# Patient Record
Sex: FEMALE | Race: WHITE | NOT HISPANIC OR LATINO | Employment: UNEMPLOYED | URBAN - METROPOLITAN AREA
[De-identification: names, ages, dates, MRNs, and addresses within clinical notes are randomized per-mention and may not be internally consistent; named-entity substitution may affect disease eponyms.]

---

## 2017-03-31 ENCOUNTER — CONVERSION ENCOUNTER (OUTPATIENT)
Dept: MAMMOGRAPHY | Facility: HOSPITAL | Age: 47
End: 2017-03-31

## 2019-02-13 ENCOUNTER — HOSPITAL ENCOUNTER (OUTPATIENT)
Dept: MAMMOGRAPHY | Facility: HOSPITAL | Age: 49
Discharge: HOME | End: 2019-02-13
Attending: OBSTETRICS & GYNECOLOGY

## 2020-05-14 ENCOUNTER — OFFICE VISIT CONVERTED (OUTPATIENT)
Dept: OTHER | Facility: HOSPITAL | Age: 50
End: 2020-05-14
Attending: NURSE PRACTITIONER

## 2020-05-14 ENCOUNTER — HOSPITAL ENCOUNTER (OUTPATIENT)
Dept: OTHER | Facility: HOSPITAL | Age: 50
Discharge: HOME | End: 2020-05-14

## 2020-05-17 LAB — SARS-COV-2 RNA SPEC QL NAA+PROBE: NOT DETECTED

## 2020-11-24 ENCOUNTER — HOSPITAL ENCOUNTER (OUTPATIENT)
Dept: URGENT CARE | Facility: CLINIC | Age: 50
Discharge: HOME | End: 2020-11-24
Attending: NURSE PRACTITIONER

## 2020-11-27 LAB — SARS-COV-2 RNA SPEC QL NAA+PROBE: NOT DETECTED

## 2021-05-03 ENCOUNTER — HOSPITAL ENCOUNTER (OUTPATIENT)
Dept: PHYSICAL THERAPY | Facility: CLINIC | Age: 51
Setting detail: RECURRING SERIES
Discharge: HOME | End: 2021-05-05
Attending: INTERNAL MEDICINE

## 2021-05-10 NOTE — H&P
History and Physical      Patient Name: Tarah Renee   Patient ID: [id number]   Sex: Female   YOB: 1970    Primary Care Provider: Joel Hernandez MD    Visit Date: May 14, 2020    Provider: DENIA Carmona   Location[facility]   Location Address: [address] [place] [address]  [id number]   Location Phone: [phone]          Chief Complaint  · Evaluation for Respiratory Virus      History Of Present Illness  Tarah Renee is a 49 year old female who presents today to the clinic for evaluation of a respiratory virus.   Date of Onset: 2020   What Symptoms: fatigue and fever   Quality of Symptoms: low grade fever   Anything make it worse or better: nothing   Severity of Symptoms: mild   Any known Exposure to COVID-19: no known exposure.      Patient presents the respiratory virus evaluation center with fatigue and fever for 2 days.  T-max 100.  No over-the-counter medicines.  No known exposure.  Patient has a history of asthma and diabetes.       Past Medical History  Diabetes         Past Surgical History           Medication List  atorvastatin 20 mg oral tablet; carvedilol 25 mg oral tablet; cetirizine 10 mg oral tablet; hydrochlorothiazide 25 mg oral tablet; Januvia 100 mg oral tablet; Jardiance 10 mg oral tablet; lisinopril 40 mg oral tablet; metformin 1,000 mg oral tablet; paroxetine HCl 10 mg oral tablet         Allergy List  amoxicillin         Family Medical History  - No Family History of Colorectal Cancer         Social History  Alcohol; Nonsmoker         Review of Systems  · Constitutional  o Admits  o : fatigue, fever  o Denies  o : weight gain, weight loss  · HENT  o Denies  o : headaches, vertigo, recent head injury, sinus pain, nasal congestion, nasal discharge, dental problems, sore throat  · Respiratory  o Denies  o : cough, asthma  · Gastrointestinal  o Denies  o : nausea, vomiting, diarrhea, constipation, abdominal  pain  · Integument  o Denies  o : rash  · Neurologic  o Denies  o : headache      Vitals  Date Time BP Position Site L\R Cuff Size HR RR TEMP (F) WT  HT  BMI kg/m2 BSA m2 O2 Sat        05/14/2020 08:08 AM      96 - R  98.2     95 %          Physical Examination  · Constitutional  o Appearance  o : no acute distress, well-nourished  · Head and Face  o Head  o :   § Inspection  § : atraumatic, normocephalic  · Eyes  o Eyes  o : extraocular movements intact, no scleral icterus, no conjunctival injection  · Ears, Nose, Mouth and Throat  o Ears  o :   § External Ears  § : normal  § Otoscopic Examination  § : normal tympanic membrane exam  o Nose  o :   § Intranasal Exam  § : sinuses nontender to palpation  o Oral Cavity  o :   § Oral Mucosa  § : moist mucous membranes  o Throat  o :   § Oropharynx  § : normal tonsils, normal oropharynx  · Respiratory  o Respiratory Effort  o : breathing comfortably, symmetric chest rise  o Auscultation of Lungs  o : clear to asculatation bilaterally, no wheezes, rales, or rhonchii  · Cardiovascular  o Heart  o :   § Auscultation of Heart  § : regular rate and rhythm, no murmurs, rubs, or gallops  o Peripheral Vascular System  o :   § Extremities  § : no edema  · Lymphatic  o Neck  o : no lymphadenopathy present  o Supraclavicular Nodes  o : no supraclavicular nodes  · Skin and Subcutaneous Tissue  o General Inspection  o : normal inspection  · Neurologic  o Mental Status Examination  o :   § Orientation  § : grossly oriented to person, place and time  o Gait and Station  o :   § Gait Screening  § : normal gait  · Psychiatric  o General  o : normal mood and affect              Assessment  · Fatigue     780.79/R53.83  · Fever     780.60/R50.9  · Upper respiratory infection     465.9/J06.9  Patient presented with symptoms of viral respiratory infection. Advised to drink plenty of fluids, run a cool-mist humidifier in room at night, gargle salt water for sore throat, and get plenty of  rest. Patient should avoid over-exertion and reduce exposure to irritants such as smoke, cold, dry air, and dust.  Treatment currently involves symptomatic relief. Patient may take acetaminophen as directed to reduce fever and body aches.   Patient understood these instructions and will follow up in the office if symptoms not improving.   Educated patient that I cannot exclude Covid-19 as a diagnosis. Patient was in tier 2 for covid testing and was tested today. Patient will be notified of results in 24 hrs. Educated patient on self quarantine and self-monitoring. Patient given work note and education provided by the CDC.      Plan  · Orders  o [facility] NCOV2 (send-out) (57357) - 780.79/R53.83, 780.60/R50.9 - 05/14/2020  · Medications  o Medications have been Reconciled  · Instructions  o Chronic conditions reviewed and taken into consideration for today's treatment plan.   o Plan of Care:   o Patient instructed to seek medical attention urgently for new or worsening symptoms.  o Patient was educated on their diagnosis, treatment, and medications today.   o Recommend self monitoring. Instructions given.   o Self-monitoring for 14 days. Instructions given.  o Stay home until without fever for 72 hours without using fever-reducing medications and other symptoms are gone.  o Electronically Identified Patient Education Materials Provided Electronically  · Disposition  o Call or Return if symptoms worsen or persist.  o As Needed for Follow Up            Electronically Signed by: DENIA Carmona -Author on May 14, 2020 08:36:24 AM

## 2021-05-15 VITALS — OXYGEN SATURATION: 95 % | HEART RATE: 96 BPM | TEMPERATURE: 98.2 F

## 2022-10-26 ENCOUNTER — HOSPITAL ENCOUNTER (EMERGENCY)
Facility: HOSPITAL | Age: 52
Discharge: HOME | End: 2022-10-26
Attending: EMERGENCY MEDICINE | Admitting: EMERGENCY MEDICINE

## 2022-10-26 ENCOUNTER — APPOINTMENT (OUTPATIENT)
Dept: GENERAL RADIOLOGY | Facility: HOSPITAL | Age: 52
End: 2022-10-26

## 2022-10-26 VITALS
OXYGEN SATURATION: 94 % | SYSTOLIC BLOOD PRESSURE: 123 MMHG | TEMPERATURE: 98.6 F | BODY MASS INDEX: 39.93 KG/M2 | WEIGHT: 239.64 LBS | DIASTOLIC BLOOD PRESSURE: 70 MMHG | RESPIRATION RATE: 16 BRPM | HEART RATE: 66 BPM | HEIGHT: 65 IN

## 2022-10-26 DIAGNOSIS — M54.9 BACK PAIN, UNSPECIFIED BACK LOCATION, UNSPECIFIED BACK PAIN LATERALITY, UNSPECIFIED CHRONICITY: Primary | ICD-10-CM

## 2022-10-26 LAB
ALBUMIN SERPL-MCNC: 4.3 G/DL (ref 3.5–5.2)
ALBUMIN/GLOB SERPL: 1.7 G/DL
ALP SERPL-CCNC: 79 U/L (ref 39–117)
ALT SERPL W P-5'-P-CCNC: 32 U/L (ref 1–33)
ANION GAP SERPL CALCULATED.3IONS-SCNC: 13.1 MMOL/L (ref 5–15)
AST SERPL-CCNC: 24 U/L (ref 1–32)
BASOPHILS # BLD AUTO: 0.11 10*3/MM3 (ref 0–0.2)
BASOPHILS NFR BLD AUTO: 0.9 % (ref 0–1.5)
BILIRUB SERPL-MCNC: 0.2 MG/DL (ref 0–1.2)
BUN SERPL-MCNC: 21 MG/DL (ref 6–20)
BUN/CREAT SERPL: 22.1 (ref 7–25)
CALCIUM SPEC-SCNC: 9.4 MG/DL (ref 8.6–10.5)
CHLORIDE SERPL-SCNC: 100 MMOL/L (ref 98–107)
CO2 SERPL-SCNC: 24.9 MMOL/L (ref 22–29)
CREAT SERPL-MCNC: 0.95 MG/DL (ref 0.57–1)
DEPRECATED RDW RBC AUTO: 41.9 FL (ref 37–54)
EGFRCR SERPLBLD CKD-EPI 2021: 71.3 ML/MIN/1.73
EOSINOPHIL # BLD AUTO: 0.54 10*3/MM3 (ref 0–0.4)
EOSINOPHIL NFR BLD AUTO: 4.6 % (ref 0.3–6.2)
ERYTHROCYTE [DISTWIDTH] IN BLOOD BY AUTOMATED COUNT: 14.5 % (ref 12.3–15.4)
GLOBULIN UR ELPH-MCNC: 2.5 GM/DL
GLUCOSE SERPL-MCNC: 200 MG/DL (ref 65–99)
HCT VFR BLD AUTO: 43.6 % (ref 34–46.6)
HGB BLD-MCNC: 13.9 G/DL (ref 12–15.9)
HOLD SPECIMEN: NORMAL
IMM GRANULOCYTES # BLD AUTO: 0.07 10*3/MM3 (ref 0–0.05)
IMM GRANULOCYTES NFR BLD AUTO: 0.6 % (ref 0–0.5)
LIPASE SERPL-CCNC: 43 U/L (ref 13–60)
LYMPHOCYTES # BLD AUTO: 3.74 10*3/MM3 (ref 0.7–3.1)
LYMPHOCYTES NFR BLD AUTO: 32 % (ref 19.6–45.3)
MAGNESIUM SERPL-MCNC: 1.9 MG/DL (ref 1.6–2.6)
MCH RBC QN AUTO: 25.6 PG (ref 26.6–33)
MCHC RBC AUTO-ENTMCNC: 31.9 G/DL (ref 31.5–35.7)
MCV RBC AUTO: 80.1 FL (ref 79–97)
MONOCYTES # BLD AUTO: 0.97 10*3/MM3 (ref 0.1–0.9)
MONOCYTES NFR BLD AUTO: 8.3 % (ref 5–12)
NEUTROPHILS NFR BLD AUTO: 53.6 % (ref 42.7–76)
NEUTROPHILS NFR BLD AUTO: 6.27 10*3/MM3 (ref 1.7–7)
NRBC BLD AUTO-RTO: 0 /100 WBC (ref 0–0.2)
NT-PROBNP SERPL-MCNC: 92.5 PG/ML (ref 0–900)
PLATELET # BLD AUTO: 255 10*3/MM3 (ref 140–450)
PMV BLD AUTO: 9.7 FL (ref 6–12)
POTASSIUM SERPL-SCNC: 3.8 MMOL/L (ref 3.5–5.2)
PROT SERPL-MCNC: 6.8 G/DL (ref 6–8.5)
RBC # BLD AUTO: 5.44 10*6/MM3 (ref 3.77–5.28)
SODIUM SERPL-SCNC: 138 MMOL/L (ref 136–145)
TROPONIN I SERPL-MCNC: 0 NG/ML (ref 0–0.08)
WBC NRBC COR # BLD: 11.7 10*3/MM3 (ref 3.4–10.8)
WHOLE BLOOD HOLD COAG: NORMAL
WHOLE BLOOD HOLD SPECIMEN: NORMAL

## 2022-10-26 PROCEDURE — 71045 X-RAY EXAM CHEST 1 VIEW: CPT

## 2022-10-26 PROCEDURE — 83880 ASSAY OF NATRIURETIC PEPTIDE: CPT | Performed by: EMERGENCY MEDICINE

## 2022-10-26 PROCEDURE — 96375 TX/PRO/DX INJ NEW DRUG ADDON: CPT

## 2022-10-26 PROCEDURE — 80053 COMPREHEN METABOLIC PANEL: CPT | Performed by: EMERGENCY MEDICINE

## 2022-10-26 PROCEDURE — 85025 COMPLETE CBC W/AUTO DIFF WBC: CPT | Performed by: EMERGENCY MEDICINE

## 2022-10-26 PROCEDURE — 93005 ELECTROCARDIOGRAM TRACING: CPT | Performed by: EMERGENCY MEDICINE

## 2022-10-26 PROCEDURE — 84484 ASSAY OF TROPONIN QUANT: CPT

## 2022-10-26 PROCEDURE — 96374 THER/PROPH/DIAG INJ IV PUSH: CPT

## 2022-10-26 PROCEDURE — 99284 EMERGENCY DEPT VISIT MOD MDM: CPT

## 2022-10-26 PROCEDURE — 25010000002 ORPHENADRINE CITRATE PER 60 MG: Performed by: EMERGENCY MEDICINE

## 2022-10-26 PROCEDURE — 36415 COLL VENOUS BLD VENIPUNCTURE: CPT

## 2022-10-26 PROCEDURE — 83735 ASSAY OF MAGNESIUM: CPT | Performed by: EMERGENCY MEDICINE

## 2022-10-26 PROCEDURE — 83690 ASSAY OF LIPASE: CPT | Performed by: EMERGENCY MEDICINE

## 2022-10-26 PROCEDURE — 25010000002 KETOROLAC TROMETHAMINE PER 15 MG: Performed by: EMERGENCY MEDICINE

## 2022-10-26 RX ORDER — CYCLOBENZAPRINE HCL 10 MG
10 TABLET ORAL 3 TIMES DAILY
Qty: 20 TABLET | Refills: 0 | Status: SHIPPED | OUTPATIENT
Start: 2022-10-26

## 2022-10-26 RX ORDER — SODIUM CHLORIDE 0.9 % (FLUSH) 0.9 %
10 SYRINGE (ML) INJECTION AS NEEDED
Status: DISCONTINUED | OUTPATIENT
Start: 2022-10-26 | End: 2022-10-26 | Stop reason: HOSPADM

## 2022-10-26 RX ORDER — KETOROLAC TROMETHAMINE 10 MG/1
10 TABLET, FILM COATED ORAL EVERY 6 HOURS PRN
Qty: 15 TABLET | Refills: 0 | Status: SHIPPED | OUTPATIENT
Start: 2022-10-26

## 2022-10-26 RX ORDER — ORPHENADRINE CITRATE 30 MG/ML
60 INJECTION INTRAMUSCULAR; INTRAVENOUS ONCE
Status: COMPLETED | OUTPATIENT
Start: 2022-10-26 | End: 2022-10-26

## 2022-10-26 RX ORDER — KETOROLAC TROMETHAMINE 30 MG/ML
30 INJECTION, SOLUTION INTRAMUSCULAR; INTRAVENOUS ONCE
Status: COMPLETED | OUTPATIENT
Start: 2022-10-26 | End: 2022-10-26

## 2022-10-26 RX ORDER — ASPIRIN 81 MG/1
324 TABLET, CHEWABLE ORAL ONCE
Status: DISCONTINUED | OUTPATIENT
Start: 2022-10-26 | End: 2022-10-26 | Stop reason: HOSPADM

## 2022-10-26 RX ORDER — AZITHROMYCIN 250 MG/1
TABLET, FILM COATED ORAL
Qty: 6 TABLET | Refills: 0 | Status: SHIPPED | OUTPATIENT
Start: 2022-10-26 | End: 2022-10-31

## 2022-10-26 RX ADMIN — KETOROLAC TROMETHAMINE 30 MG: 30 INJECTION, SOLUTION INTRAMUSCULAR; INTRAVENOUS at 06:43

## 2022-10-26 RX ADMIN — ORPHENADRINE CITRATE 60 MG: 60 INJECTION INTRAMUSCULAR; INTRAVENOUS at 06:44

## 2022-11-01 LAB
QT INTERVAL: 357 MS
QT INTERVAL: 374 MS

## 2024-02-24 ENCOUNTER — HOSPITAL ENCOUNTER (EMERGENCY)
Facility: HOSPITAL | Age: 54
Discharge: HOME | End: 2024-02-24
Attending: EMERGENCY MEDICINE
Payer: COMMERCIAL

## 2024-02-24 ENCOUNTER — APPOINTMENT (OUTPATIENT)
Dept: GENERAL RADIOLOGY | Facility: HOSPITAL | Age: 54
End: 2024-02-24
Payer: COMMERCIAL

## 2024-02-24 VITALS
HEIGHT: 65 IN | DIASTOLIC BLOOD PRESSURE: 97 MMHG | OXYGEN SATURATION: 97 % | WEIGHT: 247.8 LBS | TEMPERATURE: 97.6 F | BODY MASS INDEX: 41.29 KG/M2 | HEART RATE: 79 BPM | SYSTOLIC BLOOD PRESSURE: 145 MMHG | RESPIRATION RATE: 24 BRPM
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
## 2024-02-24 DIAGNOSIS — S20.212A CONTUSION OF LEFT CHEST WALL, INITIAL ENCOUNTER: Primary | ICD-10-CM

## 2024-02-24 PROCEDURE — 71045 X-RAY EXAM CHEST 1 VIEW: CPT

## 2024-02-24 PROCEDURE — 99282 EMERGENCY DEPT VISIT SF MDM: CPT

## 2024-02-24 RX ORDER — IBUPROFEN 800 MG/1
800 TABLET ORAL EVERY 6 HOURS PRN
Qty: 60 TABLET | Refills: 0 | Status: SHIPPED | OUTPATIENT
Start: 2024-02-24

## 2024-02-24 NOTE — DISCHARGE INSTRUCTIONS
Apply ice for 20mins on and 20mins off for the next few days, then you can transition to alternating moist heat with ice if you are still having pain  Take the prescribed ibuprofen for pain  Be sure to take good deep breaths frequently to avoid pneumonia  Follow up with your PCP in 3 days if no better  Return to the ER for any worsening or new symptoms of concern

## 2024-02-24 NOTE — ED PROVIDER NOTES
Time: 12:13 PM EST  Date of encounter:  2/24/2024  Independent Historian/Clinical History and Information was obtained by:   Patient    History is limited by: N/A    Chief Complaint: Fall      History of Present Illness:  Patient is a 53 y.o. year old female who presents to the emergency department for evaluation of upper abdominal pain after stumbling and falling on cement sidewalk.     HPI    Patient Care Team  Primary Care Provider: Joel Hernandez MD    Past Medical History:     Allergies   Allergen Reactions    Amoxicillin Hives    Penicillin G Hives     Past Medical History:   Diagnosis Date    Diabetes mellitus     Hypertension      History reviewed. No pertinent surgical history.  Family History   Problem Relation Age of Onset    Hypertension Mother     Hypertension Father        Home Medications:  Prior to Admission medications    Medication Sig Start Date End Date Taking? Authorizing Provider   atorvastatin (LIPITOR) 20 MG tablet atorvastatin 20 mg oral tablet take 1 tablet (20 mg) by oral route once daily   Active    Emergency, Nurse RUMA Gomez   carvedilol (COREG) 25 MG tablet carvedilol 25 mg oral tablet take 1 tablet (25 mg) by oral route 2 times per day with food   Active    Emergency, Nurse Epic, RN   cyclobenzaprine (FLEXERIL) 10 MG tablet Take 1 tablet by mouth 3 (Three) Times a Day. 10/26/22   Marcelo Mathis MD   empagliflozin (Jardiance) 10 MG tablet tablet Jardiance 10 mg oral tablet take 1 tablet (10 mg) by oral route once daily in the morning   Active    Emergency, Nurse Jason RN   glipizide (GLUCOTROL) 10 MG tablet Take 10 mg by mouth 2 (Two) Times a Day Before Meals.    Emergency, Nurse RUMA Gomez   hydroCHLOROthiazide (HYDRODIURIL) 25 MG tablet hydrochlorothiazide 25 mg oral tablet take one-half tablet (12.5 mg) by oral route 2 times per day   Active    Emergency, Nurse RUMA Gomez   ketorolac (TORADOL) 10 MG tablet Take 1 tablet by mouth Every 6 (Six) Hours As Needed for Moderate Pain. 10/26/22  "  Marcelo Mathis MD   lisinopril (PRINIVIL,ZESTRIL) 40 MG tablet Daily.    Emergency, Nurse Jason, RN   metFORMIN (GLUCOPHAGE) 1000 MG tablet metformin 1,000 mg oral tablet take 1 tablet (1,000 mg) by oral route 2 times per day with morning and evening meals   Active    Emergency, Nurse Jason, RN   PARoxetine (PAXIL) 10 MG tablet paroxetine HCl 10 mg oral tablet take 1 tablet (10 mg) by oral route once daily   Active    Emergency, Nurse Jason, RN   SITagliptin (Januvia) 100 MG tablet Januvia 100 mg oral tablet take 1 tablet (100 mg) by oral route once daily   Active    Emergency, Nurse Epic, RN   triamcinolone (KENALOG) 0.1 % ointment Apply 1 application topically to the appropriate area as directed 2 (Two) Times a Day. 6/10/22   Meka King MD        Social History:   Social History     Tobacco Use    Smoking status: Never    Smokeless tobacco: Never   Vaping Use    Vaping Use: Never used   Substance Use Topics    Alcohol use: Not Currently    Drug use: Never         Review of Systems:  Review of Systems   Constitutional: Negative.    HENT: Negative.     Eyes: Negative.    Respiratory: Negative.     Cardiovascular: Negative.    Gastrointestinal: Negative.    Endocrine: Negative.    Genitourinary: Negative.    Musculoskeletal:         Fall, anterior chest injury   Skin: Negative.    Allergic/Immunologic: Negative.    Neurological: Negative.    Hematological: Negative.    Psychiatric/Behavioral: Negative.          Physical Exam:  /97 (BP Location: Left arm, Patient Position: Sitting)   Pulse 79   Temp 97.6 °F (36.4 °C) (Oral)   Resp 24   Ht 165.1 cm (65\")   Wt 112 kg (247 lb 12.8 oz)   SpO2 97%   BMI 41.24 kg/m²     Physical Exam  Constitutional:       Appearance: Normal appearance.   HENT:      Head: Normocephalic and atraumatic.      Nose: Nose normal.      Mouth/Throat:      Mouth: Mucous membranes are moist.   Eyes:      Pupils: Pupils are equal, round, and reactive to light. "   Cardiovascular:      Rate and Rhythm: Normal rate and regular rhythm.      Pulses: Normal pulses.   Pulmonary:      Effort: Pulmonary effort is normal.      Breath sounds: Normal breath sounds.   Chest:       Abdominal:      General: Abdomen is flat. Bowel sounds are normal.      Palpations: Abdomen is soft.   Musculoskeletal:         General: Normal range of motion.      Cervical back: Normal range of motion.   Skin:     General: Skin is warm and dry.      Capillary Refill: Capillary refill takes less than 2 seconds.   Neurological:      General: No focal deficit present.      Mental Status: She is alert and oriented to person, place, and time. Mental status is at baseline.   Psychiatric:         Mood and Affect: Mood normal.                  Procedures:  Procedures      Medical Decision Making:      Comorbidities that affect care:    Diabetes, Obesity    External Notes reviewed:    None      The following orders were placed and all results were independently analyzed by me:  Orders Placed This Encounter   Procedures    XR Chest 1 View       Medications Given in the Emergency Department:  Medications - No data to display     ED Course:         Labs:    Lab Results (last 24 hours)       ** No results found for the last 24 hours. **             Imaging:    XR Chest 1 View    Result Date: 2/24/2024  PROCEDURE: XR CHEST 1 VW  COMPARISON: [facility], , XR CHEST 1 VW, 10/26/2022, 6:08.  INDICATIONS: fall/ chest discomfort  FINDINGS:  The lungs are clear bilaterally.  The cardiac and mediastinal silhouettes appear normal.  No effusion is seen.        1. No acute cardiopulmonary disease 2. No fracture or pneumothorax       Kiet Win M.D.       Electronically Signed and Approved By: Kiet Win M.D. on 2/24/2024 at 12:32                Differential Diagnosis and Discussion:             MDM     Amount and/or Complexity of Data Reviewed  Tests in the radiology section of CPT®: reviewed                  Patient Care Considerations:           Consultants/Shared Management Plan:    None    Social Determinants of Health:    Patient is independent, reliable, and has access to care.       Disposition and Care Coordination:    Discharged: The patient is suitable and stable for discharge with no need for consideration of admission.    I have explained the patient´s condition, diagnoses and treatment plan based on the information available to me at this time. I have answered questions and addressed any concerns. The patient has a good  understanding of the patient´s diagnosis, condition, and treatment plan as can be expected at this point. The vital signs have been stable. The patient´s condition is stable and appropriate for discharge from the emergency department.      The patient will pursue further outpatient evaluation with the primary care physician or other designated or consulting physician as outlined in the discharge instructions. They are agreeable to this plan of care and follow-up instructions have been explained in detail. The patient has received these instructions in written format and has expressed an understanding of the discharge instructions. The patient is aware that any significant change in condition or worsening of symptoms should prompt an immediate return to this or the closest emergency department or call to 911.  I have explained discharge medications and the need for follow up with the patient/caretakers. This was also printed in the discharge instructions. Patient was discharged with the following medications and follow up:      Medication List        New Prescriptions      ibuprofen 800 MG tablet  Commonly known as: ADVIL,MOTRIN  Take 1 tablet by mouth Every 6 (Six) Hours As Needed for Moderate Pain.               Where to Get Your Medications        These medications were sent to [id number] - [place], [place] - [address] AT [facility] -  [phone]  - [phone] FX  [address] [facility] [address]      Phone: [phone]   ibuprofen 800 MG tablet      Joel Hernandez MD  [address]  [address]  [facility] [address] [phone]    In 3 days  As needed       Final diagnoses:   Contusion of left chest wall, initial encounter   Fall on same level from stumbling, initial encounter        ED Disposition       ED Disposition   Discharge    Condition   Stable    Comment   --               This medical record created using voice recognition software.             Beena Tolentino, APRN  02/24/24 1249

## 2024-03-04 ENCOUNTER — TRANSCRIBE ORDERS (OUTPATIENT)
Dept: ADMINISTRATIVE | Facility: HOSPITAL | Age: 54
End: 2024-03-04
Payer: COMMERCIAL

## 2024-03-04 DIAGNOSIS — Z12.31 VISIT FOR SCREENING MAMMOGRAM: Primary | ICD-10-CM

## 2024-04-16 ENCOUNTER — HOSPITAL ENCOUNTER (OUTPATIENT)
Dept: MAMMOGRAPHY | Facility: HOSPITAL | Age: 54
Discharge: HOME | End: 2024-04-16
Admitting: NURSE PRACTITIONER
Payer: COMMERCIAL

## 2024-04-16 DIAGNOSIS — Z12.31 VISIT FOR SCREENING MAMMOGRAM: ICD-10-CM

## 2024-04-16 PROCEDURE — 77063 BREAST TOMOSYNTHESIS BI: CPT

## 2024-04-16 PROCEDURE — 77067 SCR MAMMO BI INCL CAD: CPT

## 2024-10-19 ENCOUNTER — APPOINTMENT (OUTPATIENT)
Dept: CT IMAGING | Facility: HOSPITAL | Age: 54
End: 2024-10-19
Payer: COMMERCIAL

## 2024-10-19 ENCOUNTER — HOSPITAL ENCOUNTER (EMERGENCY)
Facility: HOSPITAL | Age: 54
Discharge: HOME | End: 2024-10-20
Attending: EMERGENCY MEDICINE | Admitting: EMERGENCY MEDICINE
Payer: COMMERCIAL

## 2024-10-19 DIAGNOSIS — R11.2 NAUSEA AND VOMITING, UNSPECIFIED VOMITING TYPE: ICD-10-CM

## 2024-10-19 DIAGNOSIS — J02.0 STREP PHARYNGITIS: Primary | ICD-10-CM

## 2024-10-19 DIAGNOSIS — R51.9 NONINTRACTABLE HEADACHE, UNSPECIFIED CHRONICITY PATTERN, UNSPECIFIED HEADACHE TYPE: ICD-10-CM

## 2024-10-19 LAB
ACETONE BLD QL: NEGATIVE
ALBUMIN SERPL-MCNC: 4 G/DL (ref 3.5–5.2)
ALBUMIN/GLOB SERPL: 1.1 G/DL
ALP SERPL-CCNC: 97 U/L (ref 39–117)
ALT SERPL W P-5'-P-CCNC: 20 U/L (ref 1–33)
ANION GAP SERPL CALCULATED.3IONS-SCNC: 16.6 MMOL/L (ref 5–15)
AST SERPL-CCNC: 19 U/L (ref 1–32)
BASOPHILS # BLD AUTO: 0.15 10*3/MM3 (ref 0–0.2)
BASOPHILS NFR BLD AUTO: 1.2 % (ref 0–1.5)
BILIRUB SERPL-MCNC: 0.6 MG/DL (ref 0–1.2)
BUN SERPL-MCNC: 25 MG/DL (ref 6–20)
BUN/CREAT SERPL: 27.8 (ref 7–25)
CALCIUM SPEC-SCNC: 10.1 MG/DL (ref 8.6–10.5)
CHLORIDE SERPL-SCNC: 97 MMOL/L (ref 98–107)
CO2 SERPL-SCNC: 24.4 MMOL/L (ref 22–29)
CREAT SERPL-MCNC: 0.9 MG/DL (ref 0.57–1)
CRP SERPL-MCNC: 0.73 MG/DL (ref 0–0.5)
D-LACTATE SERPL-SCNC: 3.5 MMOL/L (ref 0.5–2)
DEPRECATED RDW RBC AUTO: 43.4 FL (ref 37–54)
EGFRCR SERPLBLD CKD-EPI 2021: 75.2 ML/MIN/1.73
EOSINOPHIL # BLD AUTO: 0.27 10*3/MM3 (ref 0–0.4)
EOSINOPHIL NFR BLD AUTO: 2.1 % (ref 0.3–6.2)
ERYTHROCYTE [DISTWIDTH] IN BLOOD BY AUTOMATED COUNT: 15.6 % (ref 12.3–15.4)
GLOBULIN UR ELPH-MCNC: 3.6 GM/DL
GLUCOSE SERPL-MCNC: 295 MG/DL (ref 65–99)
HCT VFR BLD AUTO: 48.9 % (ref 34–46.6)
HGB BLD-MCNC: 15 G/DL (ref 12–15.9)
HOLD SPECIMEN: NORMAL
HOLD SPECIMEN: NORMAL
IMM GRANULOCYTES # BLD AUTO: 0.09 10*3/MM3 (ref 0–0.05)
IMM GRANULOCYTES NFR BLD AUTO: 0.7 % (ref 0–0.5)
LIPASE SERPL-CCNC: 43 U/L (ref 13–60)
LYMPHOCYTES # BLD AUTO: 2.28 10*3/MM3 (ref 0.7–3.1)
LYMPHOCYTES NFR BLD AUTO: 17.6 % (ref 19.6–45.3)
MCH RBC QN AUTO: 24.4 PG (ref 26.6–33)
MCHC RBC AUTO-ENTMCNC: 30.7 G/DL (ref 31.5–35.7)
MCV RBC AUTO: 79.4 FL (ref 79–97)
MONOCYTES # BLD AUTO: 0.71 10*3/MM3 (ref 0.1–0.9)
MONOCYTES NFR BLD AUTO: 5.5 % (ref 5–12)
NEUTROPHILS NFR BLD AUTO: 72.9 % (ref 42.7–76)
NEUTROPHILS NFR BLD AUTO: 9.42 10*3/MM3 (ref 1.7–7)
NRBC BLD AUTO-RTO: 0 /100 WBC (ref 0–0.2)
PLATELET # BLD AUTO: 319 10*3/MM3 (ref 140–450)
PMV BLD AUTO: 10.4 FL (ref 6–12)
POTASSIUM SERPL-SCNC: 4.3 MMOL/L (ref 3.5–5.2)
PROT SERPL-MCNC: 7.6 G/DL (ref 6–8.5)
RBC # BLD AUTO: 6.16 10*6/MM3 (ref 3.77–5.28)
SODIUM SERPL-SCNC: 138 MMOL/L (ref 136–145)
WBC NRBC COR # BLD AUTO: 12.92 10*3/MM3 (ref 3.4–10.8)
WHOLE BLOOD HOLD COAG: NORMAL
WHOLE BLOOD HOLD SPECIMEN: NORMAL

## 2024-10-19 PROCEDURE — 86140 C-REACTIVE PROTEIN: CPT | Performed by: EMERGENCY MEDICINE

## 2024-10-19 PROCEDURE — 83690 ASSAY OF LIPASE: CPT

## 2024-10-19 PROCEDURE — 99284 EMERGENCY DEPT VISIT MOD MDM: CPT

## 2024-10-19 PROCEDURE — 83605 ASSAY OF LACTIC ACID: CPT | Performed by: EMERGENCY MEDICINE

## 2024-10-19 PROCEDURE — 82009 KETONE BODYS QUAL: CPT | Performed by: EMERGENCY MEDICINE

## 2024-10-19 PROCEDURE — 84145 PROCALCITONIN (PCT): CPT | Performed by: EMERGENCY MEDICINE

## 2024-10-19 PROCEDURE — 80053 COMPREHEN METABOLIC PANEL: CPT

## 2024-10-19 PROCEDURE — 36415 COLL VENOUS BLD VENIPUNCTURE: CPT

## 2024-10-19 PROCEDURE — 85025 COMPLETE CBC W/AUTO DIFF WBC: CPT

## 2024-10-19 RX ORDER — DIPHENHYDRAMINE HYDROCHLORIDE 50 MG/ML
25 INJECTION INTRAMUSCULAR; INTRAVENOUS ONCE
Status: COMPLETED | OUTPATIENT
Start: 2024-10-20 | End: 2024-10-20

## 2024-10-19 RX ORDER — METOCLOPRAMIDE HYDROCHLORIDE 5 MG/ML
10 INJECTION INTRAMUSCULAR; INTRAVENOUS ONCE
Status: COMPLETED | OUTPATIENT
Start: 2024-10-20 | End: 2024-10-20

## 2024-10-19 RX ORDER — FAMOTIDINE 10 MG/ML
20 INJECTION, SOLUTION INTRAVENOUS ONCE
Status: COMPLETED | OUTPATIENT
Start: 2024-10-20 | End: 2024-10-20

## 2024-10-19 RX ORDER — ONDANSETRON 2 MG/ML
4 INJECTION INTRAMUSCULAR; INTRAVENOUS ONCE
Status: COMPLETED | OUTPATIENT
Start: 2024-10-20 | End: 2024-10-20

## 2024-10-19 RX ORDER — SODIUM CHLORIDE 0.9 % (FLUSH) 0.9 %
10 SYRINGE (ML) INJECTION AS NEEDED
Status: DISCONTINUED | OUTPATIENT
Start: 2024-10-19 | End: 2024-10-20 | Stop reason: HOSPADM

## 2024-10-20 ENCOUNTER — APPOINTMENT (OUTPATIENT)
Dept: CT IMAGING | Facility: HOSPITAL | Age: 54
End: 2024-10-20
Payer: COMMERCIAL

## 2024-10-20 VITALS
DIASTOLIC BLOOD PRESSURE: 60 MMHG | WEIGHT: 283.51 LBS | OXYGEN SATURATION: 93 % | HEART RATE: 89 BPM | SYSTOLIC BLOOD PRESSURE: 103 MMHG | HEIGHT: 65 IN | BODY MASS INDEX: 47.24 KG/M2 | TEMPERATURE: 98.5 F | RESPIRATION RATE: 14 BRPM

## 2024-10-20 LAB
BILIRUB UR QL STRIP: NEGATIVE
CLARITY UR: CLEAR
COLOR UR: YELLOW
D-LACTATE SERPL-SCNC: 1.9 MMOL/L (ref 0.5–2)
FLUAV SUBTYP SPEC NAA+PROBE: NOT DETECTED
FLUBV RNA ISLT QL NAA+PROBE: NOT DETECTED
GLUCOSE UR STRIP-MCNC: ABNORMAL MG/DL
HGB UR QL STRIP.AUTO: NEGATIVE
KETONES UR QL STRIP: ABNORMAL
LEUKOCYTE ESTERASE UR QL STRIP.AUTO: NEGATIVE
NITRITE UR QL STRIP: NEGATIVE
PH UR STRIP.AUTO: 7 [PH] (ref 5–8)
PROCALCITONIN SERPL-MCNC: 0.07 NG/ML (ref 0–0.25)
PROT UR QL STRIP: NEGATIVE
RSV RNA NPH QL NAA+NON-PROBE: NOT DETECTED
S PYO AG THROAT QL: POSITIVE
SARS-COV-2 RNA RESP QL NAA+PROBE: NOT DETECTED
SP GR UR STRIP: >1.03 (ref 1–1.03)
UROBILINOGEN UR QL STRIP: ABNORMAL

## 2024-10-20 PROCEDURE — 87637 SARSCOV2&INF A&B&RSV AMP PRB: CPT | Performed by: EMERGENCY MEDICINE

## 2024-10-20 PROCEDURE — 70450 CT HEAD/BRAIN W/O DYE: CPT

## 2024-10-20 PROCEDURE — 96375 TX/PRO/DX INJ NEW DRUG ADDON: CPT

## 2024-10-20 PROCEDURE — 87880 STREP A ASSAY W/OPTIC: CPT | Performed by: EMERGENCY MEDICINE

## 2024-10-20 PROCEDURE — 25010000002 METOCLOPRAMIDE PER 10 MG: Performed by: EMERGENCY MEDICINE

## 2024-10-20 PROCEDURE — 83605 ASSAY OF LACTIC ACID: CPT | Performed by: EMERGENCY MEDICINE

## 2024-10-20 PROCEDURE — 25010000002 DIPHENHYDRAMINE PER 50 MG: Performed by: EMERGENCY MEDICINE

## 2024-10-20 PROCEDURE — 96376 TX/PRO/DX INJ SAME DRUG ADON: CPT

## 2024-10-20 PROCEDURE — 25010000002 ONDANSETRON PER 1 MG: Performed by: EMERGENCY MEDICINE

## 2024-10-20 PROCEDURE — 96374 THER/PROPH/DIAG INJ IV PUSH: CPT

## 2024-10-20 PROCEDURE — 81003 URINALYSIS AUTO W/O SCOPE: CPT | Performed by: EMERGENCY MEDICINE

## 2024-10-20 PROCEDURE — 25810000003 LACTATED RINGERS SOLUTION: Performed by: EMERGENCY MEDICINE

## 2024-10-20 RX ORDER — DIPHENHYDRAMINE HYDROCHLORIDE 50 MG/ML
25 INJECTION INTRAMUSCULAR; INTRAVENOUS ONCE
Status: COMPLETED | OUTPATIENT
Start: 2024-10-20 | End: 2024-10-20

## 2024-10-20 RX ORDER — ONDANSETRON 4 MG/1
8 TABLET, ORALLY DISINTEGRATING ORAL EVERY 8 HOURS PRN
Qty: 15 TABLET | Refills: 0 | Status: SHIPPED | OUTPATIENT
Start: 2024-10-20 | End: 2024-10-25

## 2024-10-20 RX ORDER — AZITHROMYCIN 250 MG/1
TABLET, FILM COATED ORAL
Qty: 6 TABLET | Refills: 0 | Status: SHIPPED | OUTPATIENT
Start: 2024-10-20

## 2024-10-20 RX ORDER — METOCLOPRAMIDE HYDROCHLORIDE 5 MG/ML
10 INJECTION INTRAMUSCULAR; INTRAVENOUS ONCE
Status: COMPLETED | OUTPATIENT
Start: 2024-10-20 | End: 2024-10-20

## 2024-10-20 RX ADMIN — FAMOTIDINE 20 MG: 10 INJECTION INTRAVENOUS at 00:10

## 2024-10-20 RX ADMIN — DIPHENHYDRAMINE HYDROCHLORIDE 25 MG: 50 INJECTION, SOLUTION INTRAMUSCULAR; INTRAVENOUS at 00:10

## 2024-10-20 RX ADMIN — METOCLOPRAMIDE 10 MG: 5 INJECTION, SOLUTION INTRAMUSCULAR; INTRAVENOUS at 03:25

## 2024-10-20 RX ADMIN — METOCLOPRAMIDE 10 MG: 5 INJECTION, SOLUTION INTRAMUSCULAR; INTRAVENOUS at 00:10

## 2024-10-20 RX ADMIN — SODIUM CHLORIDE, POTASSIUM CHLORIDE, SODIUM LACTATE AND CALCIUM CHLORIDE 1000 ML: 600; 310; 30; 20 INJECTION, SOLUTION INTRAVENOUS at 00:12

## 2024-10-20 RX ADMIN — ONDANSETRON 4 MG: 2 INJECTION INTRAMUSCULAR; INTRAVENOUS at 00:10

## 2024-10-20 RX ADMIN — DIPHENHYDRAMINE HYDROCHLORIDE 25 MG: 50 INJECTION, SOLUTION INTRAMUSCULAR; INTRAVENOUS at 03:25

## 2024-10-20 NOTE — DISCHARGE INSTRUCTIONS
Liquid diet only for the next 24 hours and then advance your diet very slowly as tolerated    Please push oral fluids    Please return to the emergency immediately if you change your mind and want to have a lumbar puncture performed to rule out meningitis as discussed in the emergency room    Please return to the emergency medially for intractable headache, neck stiffness, rash, uncontrolled fever, intractable vomiting, altered mental status or any new symptoms you are concerned with

## 2024-10-20 NOTE — ED PROVIDER NOTES
Time: 11:40 PM EDT  Date of encounter:  10/19/2024  Independent Historian/Clinical History and Information was obtained by:   Patient  Chief Complaint: Headache    History is limited by: N/A    History of Present Illness:  Patient is a 54 y.o. year old female who presents to the emergency department for evaluation of headache.  Patient notes that she began having gradual onset of headache this afternoon and it has increased in severity.  She localizes the headache to across the forehead.  She states the headache became severe and she started having nausea.  She has had several bouts of emesis.  She denies any coffee-ground emesis or mild emesis.  The patient states that she does have a sore throat but believes its from throwing up.  Patient denies a cough or shortness of breath.  The patient has no chest pain or chest pressure.  Patient states after vomiting she started developing abdominal pain which is diffuse across the abdomen.  She states her last bowel movement has been normal.  She denies any diarrhea, hematochezia or melena.  She has no urinary frequency urgency or dysuria.  HPI    Patient Care Team  Primary Care Provider: Joel Hernandez MD    Past Medical History:     Allergies   Allergen Reactions    Amoxicillin Hives    Penicillin G Hives     Past Medical History:   Diagnosis Date    Diabetes mellitus     Hypertension      History reviewed. No pertinent surgical history.  Family History   Problem Relation Age of Onset    Hypertension Mother     Hypertension Father        Home Medications:  Prior to Admission medications    Medication Sig Start Date End Date Taking? Authorizing Provider   atorvastatin (LIPITOR) 20 MG tablet atorvastatin 20 mg oral tablet take 1 tablet (20 mg) by oral route once daily   Active    Emergency, Nurse Jason RN   carvedilol (COREG) 25 MG tablet carvedilol 25 mg oral tablet take 1 tablet (25 mg) by oral route 2 times per day with food   Active    Emergency, Nurse Jason RN    cyclobenzaprine (FLEXERIL) 10 MG tablet Take 1 tablet by mouth 3 (Three) Times a Day. 10/26/22   Marcelo Mathis MD   empagliflozin (Jardiance) 10 MG tablet tablet Jardiance 10 mg oral tablet take 1 tablet (10 mg) by oral route once daily in the morning   Active    Emergency, Nurse RUMA Gomez   glipizide (GLUCOTROL) 10 MG tablet Take 10 mg by mouth 2 (Two) Times a Day Before Meals.    Emergency, Nurse Jason RN   hydroCHLOROthiazide (HYDRODIURIL) 25 MG tablet hydrochlorothiazide 25 mg oral tablet take one-half tablet (12.5 mg) by oral route 2 times per day   Active    Emergency, Nurse Jason RN   ibuprofen (ADVIL,MOTRIN) 800 MG tablet Take 1 tablet by mouth Every 6 (Six) Hours As Needed for Moderate Pain. 2/24/24   Beena Tolentino APRN   lisinopril (PRINIVIL,ZESTRIL) 40 MG tablet Daily.    Emergency, Nurse RUMA Gomez   metFORMIN (GLUCOPHAGE) 1000 MG tablet metformin 1,000 mg oral tablet take 1 tablet (1,000 mg) by oral route 2 times per day with morning and evening meals   Active    Emergency, Nurse Epic, RN   PARoxetine (PAXIL) 10 MG tablet paroxetine HCl 10 mg oral tablet take 1 tablet (10 mg) by oral route once daily   Active    Emergency, Nurse Jason RN   SITagliptin (Januvia) 100 MG tablet Januvia 100 mg oral tablet take 1 tablet (100 mg) by oral route once daily   Active    Emergency, Nurse Epic, RN   triamcinolone (KENALOG) 0.1 % ointment Apply 1 application topically to the appropriate area as directed 2 (Two) Times a Day. 6/10/22   Meka King MD        Social History:   Social History     Tobacco Use    Smoking status: Never    Smokeless tobacco: Never   Vaping Use    Vaping status: Never Used   Substance Use Topics    Alcohol use: Not Currently    Drug use: Never         Review of Systems:  Review of Systems   Constitutional:  Positive for chills. Negative for diaphoresis and fever.   HENT:  Negative for congestion, postnasal drip, rhinorrhea and sore throat.    Eyes:  Negative for  "photophobia.   Respiratory:  Negative for cough, chest tightness and shortness of breath.    Cardiovascular:  Negative for chest pain, palpitations and leg swelling.   Gastrointestinal:  Positive for abdominal pain, nausea and vomiting. Negative for diarrhea.   Genitourinary:  Negative for difficulty urinating, dysuria, flank pain, frequency, hematuria and urgency.   Musculoskeletal:  Negative for neck pain and neck stiffness.   Skin:  Negative for pallor and rash.   Neurological:  Positive for headaches. Negative for dizziness, syncope, weakness and numbness.   Hematological:  Negative for adenopathy. Does not bruise/bleed easily.   Psychiatric/Behavioral: Negative.          Physical Exam:  /60 (Patient Position: Lying)   Pulse 89   Temp 97.4 °F (36.3 °C) (Oral)   Resp 14   Ht 165.1 cm (65\")   Wt 129 kg (283 lb 8.2 oz)   SpO2 93%   BMI 47.18 kg/m²     Physical Exam  Vitals and nursing note reviewed.   Constitutional:       General: She is not in acute distress.     Appearance: Normal appearance. She is not ill-appearing, toxic-appearing or diaphoretic.   HENT:      Head: Normocephalic and atraumatic.      Mouth/Throat:      Mouth: Mucous membranes are moist.   Eyes:      Pupils: Pupils are equal, round, and reactive to light.   Cardiovascular:      Rate and Rhythm: Normal rate and regular rhythm.      Pulses: Normal pulses.           Carotid pulses are 2+ on the right side and 2+ on the left side.       Radial pulses are 2+ on the right side and 2+ on the left side.        Femoral pulses are 2+ on the right side and 2+ on the left side.       Popliteal pulses are 2+ on the right side and 2+ on the left side.        Dorsalis pedis pulses are 2+ on the right side and 2+ on the left side.        Posterior tibial pulses are 2+ on the right side and 2+ on the left side.      Heart sounds: Normal heart sounds. No murmur heard.  Pulmonary:      Effort: Pulmonary effort is normal. No accessory muscle usage, " respiratory distress or retractions.      Breath sounds: Normal breath sounds. No wheezing, rhonchi or rales.   Abdominal:      General: Abdomen is flat. There is no distension.      Palpations: Abdomen is soft. There is no mass or pulsatile mass.      Tenderness: There is no abdominal tenderness. There is no right CVA tenderness, left CVA tenderness, guarding or rebound.      Comments: No rigidity  The patient did subjectively complain of abdominal pain but on examination she had no tenderness and denies pain   Musculoskeletal:         General: No swelling, tenderness or deformity.      Cervical back: Neck supple. No rigidity or tenderness. No spinous process tenderness or muscular tenderness. Normal range of motion.      Right lower leg: No edema.      Left lower leg: No edema.   Skin:     General: Skin is warm and dry.      Capillary Refill: Capillary refill takes less than 2 seconds.      Coloration: Skin is not jaundiced or pale.      Findings: No erythema.   Neurological:      General: No focal deficit present.      Mental Status: She is alert and oriented to person, place, and time. Mental status is at baseline.      Cranial Nerves: Cranial nerves 2-12 are intact. No cranial nerve deficit.      Sensory: Sensation is intact. No sensory deficit.      Motor: Motor function is intact. No weakness or pronator drift.      Coordination: Coordination is intact. Coordination normal.   Psychiatric:         Mood and Affect: Mood normal.         Behavior: Behavior normal.                  Procedures:  Procedures      Medical Decision Making:      Comorbidities that affect care:    Diabetes, hyperlipidemia, hypertension, depression    External Notes reviewed:    None      The following orders were placed and all results were independently analyzed by me:  Orders Placed This Encounter   Procedures    COVID-19, FLU A/B, RSV PCR 1 HR TAT - Swab, Nasopharynx    Rapid Strep A Screen - Swab, Throat    CT Head Without Contrast     [place] Draw    Comprehensive Metabolic Panel    Lipase    Urinalysis With Microscopic If Indicated (No Culture) - Urine, Clean Catch    Lactic Acid, Plasma    CBC Auto Differential    STAT Lactic Acid, Reflex    Acetone    Procalcitonin    C-reactive Protein    NPO Diet NPO Type: Strict NPO    Undress & Gown    Insert Peripheral IV    CBC & Differential    Green Top (Gel)    Lavender Top    Gold Top - SST    Light Blue Top       Medications Given in the Emergency Department:  Medications   sodium chloride 0.9 % flush 10 mL (has no administration in time range)   lactated ringers bolus 1,000 mL (0 mL Intravenous Stopped 10/20/24 0206)   metoclopramide (REGLAN) injection 10 mg (10 mg Intravenous Given 10/20/24 0010)   diphenhydrAMINE (BENADRYL) injection 25 mg (25 mg Intravenous Given 10/20/24 0010)   ondansetron (ZOFRAN) injection 4 mg (4 mg Intravenous Given 10/20/24 0010)   famotidine (PEPCID) injection 20 mg (20 mg Intravenous Given 10/20/24 0010)   metoclopramide (REGLAN) injection 10 mg (10 mg Intravenous Given 10/20/24 0325)   diphenhydrAMINE (BENADRYL) injection 25 mg (25 mg Intravenous Given 10/20/24 0325)        ED Course:         Labs:    Lab Results (last 24 hours)       Procedure Component Value Units Date/Time    CBC & Differential [[id number]]  (Abnormal) Collected: 10/19/24 2259    Specimen: Blood from Arm, Right Updated: 10/19/24 2307    Narrative:      The following orders were created for panel order CBC & Differential.  Procedure                               Abnormality         Status                     ---------                               -----------         ------                     CBC Auto Differential[[id number]]        Abnormal            Final result                 Please view results for these tests on the individual orders.    Comprehensive Metabolic Panel [[id number]]  (Abnormal) Collected: 10/19/24 2259    Specimen: Blood from Arm, Right Updated: 10/19/24 2763     Glucose 295  mg/dL      BUN 25 mg/dL      Creatinine 0.90 mg/dL      Sodium 138 mmol/L      Potassium 4.3 mmol/L      Chloride 97 mmol/L      CO2 24.4 mmol/L      Calcium 10.1 mg/dL      Total Protein 7.6 g/dL      Albumin 4.0 g/dL      ALT (SGPT) 20 U/L      AST (SGOT) 19 U/L      Alkaline Phosphatase 97 U/L      Total Bilirubin 0.6 mg/dL      Globulin 3.6 gm/dL      A/G Ratio 1.1 g/dL      BUN/Creatinine Ratio 27.8     Anion Gap 16.6 mmol/L      eGFR 75.2 mL/min/1.73     Narrative:      GFR Normal >60  Chronic Kidney Disease <60  Kidney Failure <15      Lipase [[id number]]  (Normal) Collected: 10/19/24 2259    Specimen: Blood from Arm, Right Updated: 10/19/24 2323     Lipase 43 U/L     Lactic Acid, Plasma [[id number]]  (Abnormal) Collected: 10/19/24 2259    Specimen: Blood from Arm, Right Updated: 10/19/24 2334     Lactate 3.5 mmol/L     CBC Auto Differential [[id number]]  (Abnormal) Collected: 10/19/24 2259    Specimen: Blood from Arm, Right Updated: 10/19/24 2307     WBC 12.92 10*3/mm3      RBC 6.16 10*6/mm3      Hemoglobin 15.0 g/dL      Hematocrit 48.9 %      MCV 79.4 fL      MCH 24.4 pg      MCHC 30.7 g/dL      RDW 15.6 %      RDW-SD 43.4 fl      MPV 10.4 fL      Platelets 319 10*3/mm3      Neutrophil % 72.9 %      Lymphocyte % 17.6 %      Monocyte % 5.5 %      Eosinophil % 2.1 %      Basophil % 1.2 %      Immature Grans % 0.7 %      Neutrophils, Absolute 9.42 10*3/mm3      Lymphocytes, Absolute 2.28 10*3/mm3      Monocytes, Absolute 0.71 10*3/mm3      Eosinophils, Absolute 0.27 10*3/mm3      Basophils, Absolute 0.15 10*3/mm3      Immature Grans, Absolute 0.09 10*3/mm3      nRBC 0.0 /100 WBC     Acetone [[id number]]  (Normal) Collected: 10/19/24 2259    Specimen: Blood from Arm, Right Updated: 10/19/24 2350     Acetone Negative    Procalcitonin [[id number]]  (Normal) Collected: 10/19/24 2259    Specimen: Blood from Arm, Right Updated: 10/20/24 0005     Procalcitonin 0.07 ng/mL     Narrative:      As a Marker for Sepsis  "(Non-Neonates):    1. <0.5 ng/mL represents a low risk of severe sepsis and/or septic shock.  2. >2 ng/mL represents a high risk of severe sepsis and/or septic shock.    As a Marker for Lower Respiratory Tract Infections that require antibiotic therapy:    PCT on Admission    Antibiotic Therapy       6-12 Hrs later    >0.5                Strongly Recommended  >0.25 - <0.5        Recommended  0.1 - 0.25          Discouraged              Remeasure/reassess PCT  <0.1                Strongly Discouraged     Remeasure/reassess PCT    As 28 day mortality risk marker: \"Change in Procalcitonin Result\" (>80% or <=80%) if Day 0 (or Day 1) and Day 4 values are available. Refer to http://www.[employer] Technologypct-calculator.com    Change in PCT <=80%  A decrease of PCT levels below or equal to 80% defines a positive change in PCT test result representing a higher risk for 28-day all-cause mortality of patients diagnosed with severe sepsis for septic shock.    Change in PCT >80%  A decrease of PCT levels of more than 80% defines a negative change in PCT result representing a lower risk for 28-day all-cause mortality of patients diagnosed with severe sepsis or septic shock.    This test is Prognostic not Diagnostic, if elevated correlate with clinical findings before administering antibiotic treatment.        C-reactive Protein [[id number]]  (Abnormal) Collected: 10/19/24 2259    Specimen: Blood from Arm, Right Updated: 10/19/24 2358     C-Reactive Protein 0.73 mg/dL     COVID-19, FLU A/B, RSV PCR 1 HR TAT - Swab, Nasopharynx [[id number]]  (Normal) Collected: 10/20/24 0010    Specimen: Swab from Nasopharynx Updated: 10/20/24 0105     COVID19 Not Detected     Influenza A PCR Not Detected     Influenza B PCR Not Detected     RSV, PCR Not Detected    Narrative:      Fact sheet for providers: https://www.fda.gov/media/996285/download    Fact sheet for patients: https://www.fda.gov/media/352573/download    Test performed by PCR.    Rapid Strep A " Screen - Swab, Throat [[id number]]  (Abnormal) Collected: 10/20/24 0010    Specimen: Swab from Throat Updated: 10/20/24 0043     Strep A Ag Positive    Urinalysis With Microscopic If Indicated (No Culture) - Urine, Clean Catch [[id number]]  (Abnormal) Collected: 10/20/24 0204    Specimen: Urine, Clean Catch Updated: 10/20/24 0214     Color, UA Yellow     Appearance, UA Clear     pH, UA 7.0     Specific Gravity, UA >1.030     Glucose, UA >=1000 mg/dL (3+)     Ketones, UA 80 mg/dL (3+)     Bilirubin, UA Negative     Blood, UA Negative     Protein, UA Negative     Leuk Esterase, UA Negative     Nitrite, UA Negative     Urobilinogen, UA 0.2 E.U./dL    Narrative:      Urine microscopic not indicated.    STAT Lactic Acid, Reflex [[id number]]  (Normal) Collected: 10/20/24 0204    Specimen: Blood Updated: 10/20/24 0223     Lactate 1.9 mmol/L              Imaging:    CT Head Without Contrast    Result Date: 10/20/2024  CT HEAD WO CONTRAST-  Date of exam: 10/20/2024, 12:29 A.M.  Indication: headache; nausea (for 1 day)  Comparison: None available.  TECHNIQUE: Axial CT images were obtained of the head/brain without contrast administration. Reconstructed 2D (two-dimensional) coronal and sagittal images were also obtained. Automated exposure control and iterative construction methods were used. Additionally, the radiation dose reduction device was turned on for each scan per the ALARA (As Low as Reasonably Achievable) protocol. Please see the electronic medical record (EMR; Epic) for the recorded radiation dose.  FINDINGS: A routine nonenhanced head CT was performed. No acute brain abnormality is identified. No acute intracranial hemorrhage. No acute infarct is seen. The gray-white matter differentiation is preserved. No midline shift or acute intracranial mass effect is seen. The ventricular size and configuration are within normal limits. The extra-axial spaces are within normal limits. No acute skull fracture. No significant  acute findings are seen with regard to the imaged orbits. Mild age-indeterminate mucosal thickening and/or retained secretions involve(s) the imaged paranasal sinuses. No air-fluid interfaces are seen within the imaged paranasal sinuses. There is chronic leftward nasoseptal deviation with an associated nasal spur. Scattered opacities are seen in the bilateral mastoid air cell complexes and are age-indeterminate. They may be chronic in nature. No acute fractures are seen in these regions.       No acute brain abnormality is seen. Specifically, no acute intracranial hemorrhage, no acute infarct, no significant intracranial mass lesion, and no acute intracranial mass effect are appreciated. Please see above comments for further detail.    Portions of this note were completed with a voice recognition program.  Electronically Signed By-Gopal Wolf MD On:10/20/2024 1:23 AM         Differential Diagnosis and Discussion:    Headache: Differential diagnosis includes but is not limited to migraine, cluster headache, hypertension, tumor, subarachnoid bleeding, pseudotumor cerebri, temporal arteritis, infections, tension headache, and TMJ syndrome.    All labs were reviewed and interpreted by me.    MDM  Number of Diagnoses or Management Options  Nausea and vomiting, unspecified vomiting type  Nonintractable headache, unspecified chronicity pattern, unspecified headache type  Strep pharyngitis  Diagnosis management comments: The patient's white blood cell count was elevated at 12.9 thousand.  There is no bandemia.  The patient's CBC was reviewed and shows no abnormalities of critical concern.  Of note, there is no anemia requiring a blood transfusion and the platelet count is acceptable    The patient's CMP was reviewed and shows no abnormalities of critical concern.  Of note, the patient's sodium and potassium are acceptable.  The patient's liver enzymes are unremarkable.  The patient's renal function including creatinine  is preserved.      The patient's procalcitonin was normal at 0.07 which makes a severe bacterial infection unlikely    Patient's ketones are negative which makes diabetic ketoacidosis unlikely    Patient C-reactive protein was mildly elevated at 0.73 which is a nonspecific inflammatory     Patient's lipase is normal which makes acute pancreatitis unlikely    The patient's Covid swab was negative   The patient's Influenza swab was negative     The patient strep swab was positive.    The patient's urinalysis was negative for obvious infection or blood    A CT scan of the brain was performed while in the emergency room.  The CT scan demonstrated no evidence for acute abnormalities including acute infarct, hemorrhage, mass or edema    The patient's initial lactate was elevated at 3.5.  She was given IV fluids.  The patient's lactated Ringer's and reassessed and returned to 1.9 which is normal.  This typically indicates normal tissue perfusion and makes severe sepsis unlikely.    The patient was given Zofran and Pepcid for nausea.  The patient was given Reglan and Benadryl for headache.  The patient states that she did receive quite significant relief with that but she still had some residual headache.  The patient was given a second dose of Reglan and Benadryl.  Patient was reassessed.  The patient states that her headache is improved.    I discussed with the patient performing a lumbar puncture to rule meningitis as she does not typically have headaches and her white blood cell count was elevated.  She understands that lumbar puncture is the only way to rule out meningitis.  I discussed the procedure with her.  After discussing the procedure, the patient does not want to have a spinal tap performed as she does not feel that she is having meningitis as her headache is better.  I have discussed the signs and symptoms of meningitis.  The patient understands this potentially could be a neurologically permanently  damaging disease.  The patient understands and does not want to have a lumbar puncture performed at this time.    At the time of discharge, the patient appears very well, no distress and nontoxic.  Her vital signs are stable.  The patient will be placed on Zofran for nausea.  The patient will be given a Z-Yousif for strep pharyngitis as she is allergic to amoxicillin.  The patient will maintain a liquid diet for the next 24 hours and increase her diet as tolerated.  The patient will push fluids.  The patient will follow-up with her doctor on Monday for evaluation.  I have strongly encouraged the patient if she changes her mind and would like to have a lumbar puncture performed to return back to the emergency room.  I have also given her other reasons to return back to the emergency room.  She voiced understanding as well as family at the bedside they felt comfortable those instructions and comfortable for discharge and outpatient follow-up.       Amount and/or Complexity of Data Reviewed  Clinical lab tests: reviewed  Tests in the radiology section of CPT®: reviewed             Social Determinants of Health:    Patient is independent, reliable, and has access to care.       Disposition and Care Coordination:    Discharged: The patient is suitable and stable for discharge with no need for consideration of admission.    I have explained discharge medications and the need for follow up with the patient/caretakers. This was also printed in the discharge instructions. Patient was discharged with the following medications and follow up:      Medication List        New Prescriptions      azithromycin 250 MG tablet  Commonly known as: Zithromax Z-Yousif  Take 2 tablets by mouth on day 1, then 1 tablet daily on days 2-5     ondansetron ODT 4 MG disintegrating tablet  Commonly known as: ZOFRAN-ODT  Place 2 tablets on the tongue Every 8 (Eight) Hours As Needed for Nausea or Vomiting for up to 5 days.               Where to Get Your  Medications        [id number] - [place], [place] - [address] AT [facility] - [phone]  - [phone] FX  [address], [place] [address]      Phone: [phone]   azithromycin 250 MG tablet  ondansetron ODT 4 MG disintegrating tablet      Joel Hernandez MD  [address] [facility] [address]  [facility] [id number] [phone]    Schedule an appointment as soon as possible for a visit on 10/21/2024         Final diagnoses:   Strep pharyngitis   Nausea and vomiting, unspecified vomiting type   Nonintractable headache, unspecified chronicity pattern, unspecified headache type        ED Disposition       ED Disposition   Discharge    Condition   Stable    Comment   --               This medical record created using voice recognition software.             Jimmy Lund DO  10/20/24 0519

## 2025-01-15 ENCOUNTER — TELEPHONE (OUTPATIENT)
Dept: OBSTETRICS AND GYNECOLOGY | Facility: CLINIC | Age: 55
End: 2025-01-15
Payer: COMMERCIAL

## 2025-01-15 ENCOUNTER — OFFICE VISIT (OUTPATIENT)
Dept: OBSTETRICS AND GYNECOLOGY | Facility: CLINIC | Age: 55
End: 2025-01-15
Payer: COMMERCIAL

## 2025-01-15 VITALS
DIASTOLIC BLOOD PRESSURE: 72 MMHG | HEIGHT: 65 IN | SYSTOLIC BLOOD PRESSURE: 123 MMHG | HEART RATE: 77 BPM | BODY MASS INDEX: 41.32 KG/M2 | WEIGHT: 248 LBS

## 2025-01-15 DIAGNOSIS — N95.0 POSTMENOPAUSAL BLEEDING: Primary | ICD-10-CM

## 2025-01-15 PROCEDURE — 87624 HPV HI-RISK TYP POOLED RSLT: CPT | Performed by: OBSTETRICS & GYNECOLOGY

## 2025-01-15 PROCEDURE — G0123 SCREEN CERV/VAG THIN LAYER: HCPCS | Performed by: OBSTETRICS & GYNECOLOGY

## 2025-01-15 NOTE — PROGRESS NOTES
"GYN Problem/Follow Up Visit    Chief Complaint   Patient presents with    POST MENOPAUSAL BLEEDING           HPI  Tarah Vanesa Renee is a 54 y.o. female, , who presents for pmb x 3-4 months. States no menses or vb for 5-6 years prior to this. States it is not everyday but pretty frequently. It is only when she wipes. Does notice some mild intermittent cramping. Denies abnormal vaginal d/c, itching, or odor. Sexually active at times. Denies dyspareunia. Denies any hormone use or otc/herbal supplements.        Additional OB/GYN History   No LMP recorded. Patient is postmenopausal.  Allergies : Amoxicillin and Penicillin g     The additional following portions of the patient's history were reviewed and updated as appropriate: allergies, current medications, past family history, past medical history, past social history, past surgical history, and problem list.    Review of Systems    I have reviewed and agree with the HPI, ROS, and historical information as entered above. Alison Chapman, APRN    Objective   /72   Pulse 77   Ht 165.1 cm (65\")   Wt 112 kg (248 lb)   BMI 41.27 kg/m²     Physical Exam  Vitals reviewed.   Genitourinary:     General: Normal vulva.      Vagina: No signs of injury and foreign body. Bleeding (scant) present. No vaginal discharge, erythema, tenderness, lesions or prolapsed vaginal walls.      Cervix: Normal.      Uterus: Normal. Not tender.       Adnexa: Right adnexa normal and left adnexa normal.        Right: No tenderness.          Left: No tenderness.        Comments: Vaginal atrophy noted.  Skin:     General: Skin is warm and dry.   Neurological:      Mental Status: She is alert and oriented to person, place, and time.            Assessment and Plan    Diagnoses and all orders for this visit:    1. Postmenopausal bleeding (Primary)  -     US Non-ob Transvaginal; Future  -     IgP, Aptima HPV    Will check pap and pelvic u/s to r/o cervical and uterine etiology. F/u after u/s. " Consider emb if endometrium thickened.     Counseling:  She understands the importance of having the above orders performed in a timely fashion.  She is encouraged to review her results online and/or contact or office if she has questions.     Follow Up:  Return for u/s f/u.      Alison Chapman, DENIA  01/15/2025

## 2025-01-16 ENCOUNTER — PATIENT ROUNDING (BHMG ONLY) (OUTPATIENT)
Dept: OBSTETRICS AND GYNECOLOGY | Facility: CLINIC | Age: 55
End: 2025-01-16
Payer: COMMERCIAL

## 2025-01-16 NOTE — PROGRESS NOTES
A My-Chart message has been sent to the patient for PATIENT [place] [facility] [place]

## 2025-01-21 LAB
CYTOLOGIST CVX/VAG CYTO: NORMAL
CYTOLOGY CVX/VAG DOC CYTO: NORMAL
CYTOLOGY CVX/VAG DOC THIN PREP: NORMAL
DX ICD CODE: NORMAL
HPV I/H RISK 4 DNA CVX QL PROBE+SIG AMP: NEGATIVE
Lab: NORMAL
Lab: NORMAL
OTHER STN SPEC: NORMAL
STAT OF ADQ CVX/VAG CYTO-IMP: NORMAL

## 2025-01-22 ENCOUNTER — HOSPITAL ENCOUNTER (OUTPATIENT)
Dept: ULTRASOUND IMAGING | Facility: HOSPITAL | Age: 55
Discharge: HOME | End: 2025-01-22
Admitting: OBSTETRICS & GYNECOLOGY
Payer: COMMERCIAL

## 2025-01-22 DIAGNOSIS — N95.0 POSTMENOPAUSAL BLEEDING: ICD-10-CM

## 2025-01-22 PROCEDURE — 76830 TRANSVAGINAL US NON-OB: CPT

## 2025-01-23 ENCOUNTER — OFFICE VISIT (OUTPATIENT)
Dept: OBSTETRICS AND GYNECOLOGY | Facility: CLINIC | Age: 55
End: 2025-01-23
Payer: COMMERCIAL

## 2025-01-23 VITALS
DIASTOLIC BLOOD PRESSURE: 65 MMHG | BODY MASS INDEX: 42.6 KG/M2 | HEART RATE: 81 BPM | SYSTOLIC BLOOD PRESSURE: 118 MMHG | WEIGHT: 256 LBS

## 2025-01-23 DIAGNOSIS — N95.0 POSTMENOPAUSAL BLEEDING: Primary | ICD-10-CM

## 2025-01-23 DIAGNOSIS — R93.89 THICKENED ENDOMETRIUM: ICD-10-CM

## 2025-01-23 RX ORDER — SEMAGLUTIDE 0.68 MG/ML
0.25 INJECTION, SOLUTION SUBCUTANEOUS WEEKLY
COMMUNITY
Start: 2024-09-27

## 2025-01-23 RX ORDER — BLOOD-GLUCOSE METER
1 KIT MISCELLANEOUS 2 TIMES DAILY
COMMUNITY
Start: 2024-09-27

## 2025-01-23 RX ORDER — PAROXETINE 20 MG/1
TABLET, FILM COATED ORAL
COMMUNITY
Start: 2025-01-16

## 2025-01-23 NOTE — PROGRESS NOTES
GYN Problem/Follow Up Visit    Chief Complaint   Patient presents with    Follow-up     GYN US           HPI  Taarh Vanesa Renee is a 54 y.o. female, , who presents for u/s f/u. Recently seen for pmb. See previous note. No new concerns.        Additional OB/GYN History   No LMP recorded. Patient is postmenopausal.  Allergies : Amoxicillin and Penicillin g     The additional following portions of the patient's history were reviewed and updated as appropriate: allergies, current medications, past family history, past medical history, past social history, past surgical history, and problem list.    Review of Systems    I have reviewed and agree with the HPI, ROS, and historical information as entered above. Alison Chapman, DENIA    Objective   /65   Pulse 81   Wt 116 kg (256 lb)   BMI 42.60 kg/m²     Physical Exam  Vitals reviewed.   Neurological:      Mental Status: She is alert and oriented to person, place, and time.            Assessment and Plan    Diagnoses and all orders for this visit:    1. Postmenopausal bleeding (Primary)    2. Thickened endometrium    Pelvic u/s from yesterday not read by radiologist yet. Reviewed technician's report and they reported endometrium of 16 mm. Discussed findings with pt and let her know that we would see what the official report says but to go ahead and plan on returning for emb. Discussed procedure and r/b.     Counseling:  She understands the importance of having the above orders performed in a timely fashion.  She is encouraged to review her results online and/or contact or office if she has questions.     Follow Up:  Return for emb.      DENIA Benedict  2025

## 2025-01-29 ENCOUNTER — PROCEDURE VISIT (OUTPATIENT)
Dept: OBSTETRICS AND GYNECOLOGY | Facility: CLINIC | Age: 55
End: 2025-01-29
Payer: COMMERCIAL

## 2025-01-29 VITALS
DIASTOLIC BLOOD PRESSURE: 79 MMHG | HEART RATE: 85 BPM | BODY MASS INDEX: 41.82 KG/M2 | HEIGHT: 65 IN | SYSTOLIC BLOOD PRESSURE: 117 MMHG | WEIGHT: 251 LBS

## 2025-01-29 DIAGNOSIS — N95.0 POSTMENOPAUSAL BLEEDING: Primary | ICD-10-CM

## 2025-01-29 PROCEDURE — 88305 TISSUE EXAM BY PATHOLOGIST: CPT | Performed by: OBSTETRICS & GYNECOLOGY

## 2025-01-29 NOTE — PROGRESS NOTES
Endometrial Biopsy    Tarah Renee is a 54 y.o. female,   , whose last menstrual period was No LMP recorded. Patient is postmenopausal..  The patient has a history of post menopausal bleeding and presents for an endometrial biopsy. See previous note.  After the indications, risks, benefits, and alternatives to performing an endometrial biopsy were explained to the patient, pt desires proceeding with emb.     PROCEDURE:  The patient was placed on the table in the supine lithotomy position.  She was draped in the appropriate manner.  A speculum was placed in the vagina.  The cervix was visualized and prepped with Betadine. A tenaculum was placed. A small plastic 5 mm Pipelle syringe curette was inserted into the cervical canal.  The uterus was sounded to 7 cms.  A vigorous four quadrant biopsy was performed, removing a moderate amount of tissue.  This tissue was placed in Formalin and sent to pathology.  The patient tolerated the procedure well and reported Mild cramping.  She had Mild cramping at the time of discharge.  Physical Exam    Procedures    Review of Systems      Plan:  No orders of the defined types were placed in this encounter.      Problem List Items Addressed This Visit    None  Visit Diagnoses       Postmenopausal bleeding    -  Primary    Relevant Orders    Tissue Pathology Exam                Instructions  Will call with biopsy results.  Patient instructed to call the office if develops a fever of 100.4 or greater, vaginal bleeding heavier than a period, foul vaginal discharge or pain.      DENIA Benedict

## 2025-01-31 ENCOUNTER — TELEPHONE (OUTPATIENT)
Dept: OBSTETRICS AND GYNECOLOGY | Facility: CLINIC | Age: 55
End: 2025-01-31
Payer: COMMERCIAL

## 2025-01-31 LAB
CYTO UR: NORMAL
LAB AP CASE REPORT: NORMAL
LAB AP CLINICAL INFORMATION: NORMAL
LAB AP DIAGNOSIS COMMENT: NORMAL
PATH REPORT.FINAL DX SPEC: NORMAL
PATH REPORT.GROSS SPEC: NORMAL

## 2025-01-31 NOTE — TELEPHONE ENCOUNTER
Pathology called on patient of  with endometrial biopsy results. Endometrioid Carcinoma FIGO grade 1.

## 2025-01-31 NOTE — TELEPHONE ENCOUNTER
I attempted to call Sherwin on her home number, she did not answer but she did have a voicemail.  I instructed her I was trying to reach her regarding her lab results and that I work with DENIA Chapman.    Electronically signed by Brie Gaming DO, 01/31/25, 4:15 PM EST.

## 2025-02-03 ENCOUNTER — OFFICE VISIT (OUTPATIENT)
Dept: OBSTETRICS AND GYNECOLOGY | Facility: CLINIC | Age: 55
End: 2025-02-03
Payer: COMMERCIAL

## 2025-02-03 VITALS
HEIGHT: 65 IN | HEART RATE: 87 BPM | DIASTOLIC BLOOD PRESSURE: 72 MMHG | WEIGHT: 250 LBS | SYSTOLIC BLOOD PRESSURE: 104 MMHG | BODY MASS INDEX: 41.65 KG/M2

## 2025-02-03 DIAGNOSIS — C54.1 ENDOMETRIAL CARCINOMA: Primary | ICD-10-CM

## 2025-02-03 NOTE — TELEPHONE ENCOUNTER
Alison sent a message to our office today - she states the patient would like to be referred to [facility] and orders have been placed.  Working on getting that appt.

## 2025-02-03 NOTE — TELEPHONE ENCOUNTER
Appt Dr. Ines Mcneill on 2/12/25 @ 1:00 pm - Pt aware, address and phone number sent to patient and I also spoke w/patient

## 2025-02-03 NOTE — PROGRESS NOTES
"GYN Problem/Follow Up Visit    Chief Complaint   Patient presents with    Discuss bx results           HPI  Tarah Vanesa Renee is a 54 y.o. female, , who presents for biopsy results. Recently seen for pmb. See previous note. Was found to have thickened endometrium and emb was performed. No new concerns.        Additional OB/GYN History   No LMP recorded. Patient is postmenopausal.  Allergies : Amoxicillin and Penicillin g     The additional following portions of the patient's history were reviewed and updated as appropriate: allergies, current medications, past family history, past medical history, past social history, past surgical history, and problem list.    Review of Systems    I have reviewed and agree with the HPI, ROS, and historical information as entered above. Alison Chapman, APRN    Objective   /72   Pulse 87   Ht 165.1 cm (65\")   Wt 113 kg (250 lb) Comment: verbal weight from patient  BMI 41.60 kg/m²     Physical Exam  Vitals reviewed.   Neurological:      Mental Status: She is alert and oriented to person, place, and time.            Assessment and Plan    Diagnoses and all orders for this visit:    1. Endometrial carcinoma (Primary)  -     Ambulatory Referral to Gynecologic Oncology    Reviewed emb path report from 25: endometrial carcinoma grade 1. Discussed results and pt prefers referral to [facility]. She will f/u here prn.     Counseling:  She understands the importance of having the above orders performed in a timely fashion.  She is encouraged to review her results online and/or contact or office if she has questions.     Follow Up:  Return if symptoms worsen or fail to improve.      Alison Chapman, DENIA  2025  "

## 2025-05-08 ENCOUNTER — TELEPHONE (OUTPATIENT)
Dept: OBSTETRICS AND GYNECOLOGY | Age: 55
End: 2025-05-08
Payer: COMMERCIAL

## 2025-05-08 NOTE — TELEPHONE ENCOUNTER
Left voicemail. Patient needs to reschedule 07/07/25 appointment with DENIA Chapman due to provider being out of office. Please reschedule patient if she calls in.     HUB TO RELAY

## 2025-07-15 ENCOUNTER — OFFICE VISIT (OUTPATIENT)
Dept: OBSTETRICS AND GYNECOLOGY | Age: 55
End: 2025-07-15
Payer: COMMERCIAL

## 2025-07-15 VITALS
HEIGHT: 65 IN | SYSTOLIC BLOOD PRESSURE: 122 MMHG | BODY MASS INDEX: 40.32 KG/M2 | HEART RATE: 86 BPM | DIASTOLIC BLOOD PRESSURE: 77 MMHG | WEIGHT: 242 LBS

## 2025-07-15 DIAGNOSIS — Z85.42 HISTORY OF ENDOMETRIAL CANCER: ICD-10-CM

## 2025-07-15 DIAGNOSIS — Z00.00 NORMAL EXAM: Primary | ICD-10-CM

## 2025-07-15 NOTE — PROGRESS NOTES
"GYN Problem/Follow Up Visit    Chief Complaint   Patient presents with    Gynecologic Exam           HPI  Tarah Vanesa Renee is a 55 y.o. female, , who presents for exam per recommendation of gyn onc. She was dx with endometrial cancer earlier this year and is s/p hysterectomy bso. Her gyn onc recommended she have a vaginal, rectal, and pelvic exam every 3-4 months x 3 years and that she could do that with her regular obgyn. She denies vb or pain. States doing well. No concerns.        Additional OB/GYN History   No LMP recorded. Patient is postmenopausal.  Allergies : Amoxicillin and Penicillin g     The additional following portions of the patient's history were reviewed and updated as appropriate: allergies, current medications, past family history, past medical history, past social history, past surgical history, and problem list.    Review of Systems    I have reviewed and agree with the HPI, ROS, and historical information as entered above. Alison Chapman, APRN    Objective   /77   Pulse 86   Ht 165.1 cm (65\")   Wt 110 kg (242 lb)   BMI 40.27 kg/m²     Physical Exam  Vitals reviewed.   Genitourinary:     General: Normal vulva.      Vagina: Normal.      Rectum: Normal.      Comments: Cervix, uterus, and ovaries surg absent.  Skin:     General: Skin is warm and dry.   Neurological:      Mental Status: She is alert and oriented to person, place, and time.            Assessment and Plan    Diagnoses and all orders for this visit:    1. Normal exam (Primary)    2. History of endometrial cancer    Discussed normal exam. F/u as advised by her gyn onc, sooner for any concerns.     Counseling:  She understands the importance of having the above orders performed in a timely fashion.  She is encouraged to review her results online and/or contact or office if she has questions.     Follow Up:  Return in about 3 months (around 10/15/2025) for pelvic and vaginal exam per gyn onc.      Alison Chapman, " APRN  07/15/2025